# Patient Record
Sex: FEMALE | Race: WHITE | NOT HISPANIC OR LATINO | URBAN - METROPOLITAN AREA
[De-identification: names, ages, dates, MRNs, and addresses within clinical notes are randomized per-mention and may not be internally consistent; named-entity substitution may affect disease eponyms.]

---

## 2020-08-25 ENCOUNTER — OFFICE VISIT CONVERTED (OUTPATIENT)
Dept: GASTROENTEROLOGY | Facility: CLINIC | Age: 45
End: 2020-08-25
Attending: NURSE PRACTITIONER

## 2021-05-10 NOTE — H&P
History and Physical      Patient Name: Minal Sánchez   Patient ID: [id number]   Sex: Female   YOB: 1975    Primary Care Provider: Eusebio Campoverde MD   Referring Provider: Eusebio Campoverde MD    Visit Date: August 25, 2020    Provider: SUMANTH Masters   Location: [facility]   Location Address: [address] [place] [address], [address]  [place], [place]  [id number]   Location Phone: [phone]          Chief Complaint  · Diarrhea      History Of Present Illness  The patient is a 45 year old /White female who presents on referral from Eusebio Campoverde MD for a gastroenterology evaluation.      Patient reports diarrhea on and off since 2018.  She reports having a colonoscopy in 2018 and was diagnosed with collagenous colitis however was never placed on any medication. PCP recently placed pt on budesonide taper 2 weeks ago and she is already noticed a significant decrease in diarrhea.  Having a bowel movement now 1-2 times daily, formed stool.  Reports she almost canceled appointment due to relief of symptoms however she wanted to make sure she was on the right medication    Denies hematochezia, melena, or family hx of colon cancer.     Lower abdominal pain present first thing in the morning, with relief after bowel movement.     Nauseated often, without vomiting. Appetite and weight stable.     CBC 8/13/2020: WBC 8.5, hemoglobin 14.1, hematocrit 43, platelets 254.    Colonoscopy 8/31/2018 (Dr. Campoverde): Intractable diarrhea, status post cholecystectomy, random biopsies obtained.:  Random colon biopsiescollagenous colitis.       Past Medical History  Colitis; GERD; Hypertriglyceridemia         Past Surgical History  Colonoscopy         Medication List  cyclobenzaprine 10 mg oral tablet; duloxetine 60 mg oral capsule,delayed release(/EC); Entocort EC 3 mg oral capsule,delayed,extend.release; lorazepam 1 mg oral tablet; metoprolol succinate 100 mg oral tablet extended release 24 hr          Allergy List  NO KNOWN DRUG ALLERGIES       Allergies Reconciled  Family Medical History  No family history of colorectal cancer         Social History  Tobacco (Current every day)         Review of Systems  · Constitutional  o Denies  o : chills, fever  · Eyes  o Denies  o : blurred vision, changes in vision  · Cardiovascular  o Denies  o : chest pain, syncope  · Respiratory  o Denies  o : shortness of breath, hoarseness  · Gastrointestinal  o Admits  o : See HPI  · Genitourinary  o Denies  o : dysuria, blood in urine  · Integument  o Denies  o : rash, new skin lesions  · Neurologic  o Denies  o : altered mental status, tingling or numbness  · Musculoskeletal  o Denies  o : joint pain, limitation of motion  · Endocrine  o Denies  o : weight gain, weight loss  · Psychiatric  o Denies  o : anxiety, depression      Physical Examination  · Constitutional  o Appearance  o : well developed, well-nourished, in no acute distress  · Eyes  o Vision  o :   § Visual Fields  § : eyes move symmetrical in all directions  o Sclerae  o : anicteric  o Pupils and Irises  o : pupils equal and symmetrical  · Neck  o Inspection/Palpation  o : supple  · Respiratory  o Respiratory Effort  o : breathing unlabored  o Inspection of Chest  o : normal appearance, no retractions  o Auscultation of Lungs  o : clear to auscultation bilaterally  · Cardiovascular  o Heart  o :   § Auscultation of Heart  § : no murmurs, gallops or rubs  · Gastrointestinal  o Abdominal Examination  o : soft, nontender to palpation, with normal active bowel sounds, no appreciable hepatosplenomegaly  o Digital Rectal Exam  o : deferred  · Lymphatic  o Neck  o : no palpable lymphadenopathy  · Skin and Subcutaneous Tissue  o General Inspection  o : without focal lesions; turgor is normal  · Psychiatric  o General  o : Alert and oriented x3  o Mood and Affect  o : Mood and affect are appropriate to  circumstances          Assessment  · Diarrhea     787.91/R19.7  · Collagenous colitis     558.9/K52.831    Problems Reconciled  Plan  · Medications  o Medications have been Reconciled  o Transition of Care or Provider Policy  · Instructions  o Information given on current diagnoses.  o Lifestyle modifications discussed.  o Patient states that she would like to follow-up PRN since budesonide is working. Encouraged her to please call office if diarrhea returns or any new GI complaints.  o Electronically Identified Patient Education Materials Provided Electronically  · Disposition  o Follow up PRN-Call if any change in bowel pattern, abdominal pain, rectal bleeding, or any new GI complaint            Electronically Signed by: SUMANTH Masters -Author on August 25, 2020 09:43:12 AM

## 2021-05-14 VITALS
BODY MASS INDEX: 33.27 KG/M2 | SYSTOLIC BLOOD PRESSURE: 165 MMHG | DIASTOLIC BLOOD PRESSURE: 71 MMHG | HEART RATE: 64 BPM | WEIGHT: 219.5 LBS | HEIGHT: 68 IN